# Patient Record
Sex: MALE | Race: WHITE | Employment: UNEMPLOYED | URBAN - NONMETROPOLITAN AREA
[De-identification: names, ages, dates, MRNs, and addresses within clinical notes are randomized per-mention and may not be internally consistent; named-entity substitution may affect disease eponyms.]

---
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
## 2022-03-23 ENCOUNTER — OFFICE VISIT (OUTPATIENT)
Dept: PRIMARY CARE CLINIC | Age: 22
End: 2022-03-23
Payer: COMMERCIAL

## 2022-03-23 VITALS
SYSTOLIC BLOOD PRESSURE: 104 MMHG | OXYGEN SATURATION: 97 % | WEIGHT: 167.6 LBS | BODY MASS INDEX: 25.48 KG/M2 | HEART RATE: 90 BPM | DIASTOLIC BLOOD PRESSURE: 62 MMHG | TEMPERATURE: 97.3 F

## 2022-03-23 DIAGNOSIS — R36.9 DRAINAGE FROM PENIS: ICD-10-CM

## 2022-03-23 DIAGNOSIS — Z20.2 EXPOSURE TO SEXUALLY TRANSMITTED DISEASE (STD): ICD-10-CM

## 2022-03-23 DIAGNOSIS — Z20.2 EXPOSURE TO SEXUALLY TRANSMITTED DISEASE (STD): Primary | ICD-10-CM

## 2022-03-23 PROCEDURE — 99213 OFFICE O/P EST LOW 20 MIN: CPT | Performed by: NURSE PRACTITIONER

## 2022-03-23 PROCEDURE — G8427 DOCREV CUR MEDS BY ELIG CLIN: HCPCS | Performed by: NURSE PRACTITIONER

## 2022-03-23 PROCEDURE — 1036F TOBACCO NON-USER: CPT | Performed by: NURSE PRACTITIONER

## 2022-03-23 PROCEDURE — G8419 CALC BMI OUT NRM PARAM NOF/U: HCPCS | Performed by: NURSE PRACTITIONER

## 2022-03-23 PROCEDURE — G8484 FLU IMMUNIZE NO ADMIN: HCPCS | Performed by: NURSE PRACTITIONER

## 2022-03-23 RX ORDER — DOXYCYCLINE HYCLATE 100 MG
100 TABLET ORAL 2 TIMES DAILY
Qty: 20 TABLET | Refills: 0 | Status: SHIPPED | OUTPATIENT
Start: 2022-03-23 | End: 2022-04-02

## 2022-03-23 RX ORDER — METRONIDAZOLE 500 MG/1
2000 TABLET ORAL ONCE
Qty: 4 TABLET | Refills: 0 | Status: SHIPPED | OUTPATIENT
Start: 2022-03-23 | End: 2022-03-23

## 2022-03-23 ASSESSMENT — PATIENT HEALTH QUESTIONNAIRE - PHQ9
1. LITTLE INTEREST OR PLEASURE IN DOING THINGS: 0
2. FEELING DOWN, DEPRESSED OR HOPELESS: 0
SUM OF ALL RESPONSES TO PHQ QUESTIONS 1-9: 0
SUM OF ALL RESPONSES TO PHQ QUESTIONS 1-9: 0
SUM OF ALL RESPONSES TO PHQ9 QUESTIONS 1 & 2: 0
SUM OF ALL RESPONSES TO PHQ QUESTIONS 1-9: 0
SUM OF ALL RESPONSES TO PHQ QUESTIONS 1-9: 0

## 2022-03-23 NOTE — PROGRESS NOTES
Chief Complaint:   Exposure to STD      History of Present Illness   Source of history provided by:  patient. Zeke Monroy is a 24 y.o. old male who presents to the [facility] care for concerns of known exposure to STD with abdominal pain, cloudy urine, dysuria, penile discharge and urinary frequency. He reports his partner called him and stated that she had gonorrhea and chlamydia. He reports that this is the second time that he has had to be checked and treated for an STD. Pt denies any fever, chills, back pain, pain with erection, or lethargy. Review of Systems    Unless otherwise stated in this report or unable to obtain because of the patient's clinical or mental status as evidenced by the medical record, this patients's positive and negative responses for Review of Systems, constitutional, psych, eyes, ENT, cardiovascular, respiratory, gastrointestinal, neurological, genitourinary, musculoskeletal, integument systems and systems related to the presenting problem are either stated in the preceding or were not pertinent or were negative for the symptoms and/or complaints related to the medical problem. Past Medical History:  has no past medical history on file. Past Surgical History:  has no past surgical history on file. Social History:  reports that he has never smoked. He has never used smokeless tobacco.  Family History: family history is not on file. Allergies: Patient has no known allergies. Physical Exam   Vital Signs:  /62 (Site: Left Upper Arm, Position: Sitting, Cuff Size: Large Adult)   Pulse 90   Temp 97.3 °F (36.3 °C) (Temporal)   Wt 167 lb 9.6 oz (76 kg)   SpO2 97%   BMI 25.48 kg/m²    Oxygen Saturation Interpretation: Normal.    Constitutional:  Alert, development consistent with age. Neck:  Normal ROM. Supple.   Lungs:  Clear to auscultation and breath sounds equal.  Heart:  Regular rate and rhythm, normal heart sounds, without pathological murmurs, ectopy, gallops, or rubs. Abdomen:  Soft, nontender, good bowel sounds. No firm or pulsatile mass. Genitalia Exam:        Penis: No obvious lesions noted on shaft of penis. Scrotum: No tenderness or lesions noted. Testicle: Descended bilaterally. No masses noted. Skin:  Normal turgor. Warm, dry, without visible rash, unless noted elsewhere. Neurological:  Orientation age-appropriate. Motor functions intact. Test Results Section   (All laboratory and radiology results have been personally reviewed by myself)  Labs:  No results found for this visit on 03/23/22. Assessment / Plan     Impression(s):  Theresa Thornton was seen today for exposure to std. Diagnoses and all orders for this visit:    Exposure to sexually transmitted disease (STD)  -     metroNIDAZOLE (FLAGYL) 500 MG tablet; Take 4 tablets by mouth once for 1 dose  -     doxycycline hyclate (VIBRA-TABS) 100 MG tablet; Take 1 tablet by mouth 2 times daily for 10 days  -     C.TRACHOMATIS Julia Jensen; Future    Drainage from penis        Urine sent for GC/CT testing today. Labs obtained and pending, will call with results once available. Pt will be required to give their SSN to receive these results via phone. I am going to cover pt for GC/CT today with metronidazole and doxycycline, potential reactions discussed. Advise f/u with PCP if still symptomatic after 1 week. ED sooner if symptoms worsen or change. ED immediately with the development of fever, lethargy, pain with erection, abdominal pain, vomiting, or back pain. Pt states understanding and is in agreement with this care plan. All questions answered. Return if symptoms worsen or fail to improve.     JOSE Berry - NAHID

## 2022-03-25 LAB
C. TRACHOMATIS DNA ,URINE: NEGATIVE
N. GONORRHOEAE DNA, URINE: NEGATIVE
SOURCE: NORMAL

## 2022-03-28 ENCOUNTER — OFFICE VISIT (OUTPATIENT)
Dept: PRIMARY CARE CLINIC | Age: 22
End: 2022-03-28
Payer: COMMERCIAL

## 2022-03-28 VITALS
WEIGHT: 167.4 LBS | TEMPERATURE: 97.8 F | BODY MASS INDEX: 25.45 KG/M2 | HEART RATE: 105 BPM | DIASTOLIC BLOOD PRESSURE: 62 MMHG | SYSTOLIC BLOOD PRESSURE: 120 MMHG | OXYGEN SATURATION: 98 %

## 2022-03-28 DIAGNOSIS — N50.819 TESTICULAR DISCOMFORT: Primary | ICD-10-CM

## 2022-03-28 PROCEDURE — 99213 OFFICE O/P EST LOW 20 MIN: CPT

## 2022-03-28 NOTE — PROGRESS NOTES
Chief Complaint       Other (umbilical hernia for approx. 1 year ,   numbness in right testicle  )      History of Present Illness   Source of history provided by:  patient. Jaime Ramon is a 24 y.o. male who presenting to the walk in clinic for evaluation of right testicular \"numbness\" intermittently x 6 months. Patient states he was told he has an inguinal hernia on the right side. He was seen on 3/23 and evaluated for similar symptoms which he thought may have been due to STD exposure, testing negative. Denies any testicular pain, abdominal pain, discharge, hematuria, dysuria, nausea, or vomiting. The complaint has been constant in severity. Patient has job that requires heavy lifting and works out heavily. He requests excuse for work today. ROS    Unless otherwise stated in this report or unable to obtain because of the patient's clinical or mental status as evidenced by the medical record, this patients's positive and negative responses for Review of Systems, constitutional, psych, eyes, ENT, cardiovascular, respiratory, gastrointestinal, neurological, genitourinary, musculoskeletal, integument systems and systems related to the presenting problem are either stated in the preceding or were not pertinent or were negative for the symptoms and/or complaints related to the medical problem. Physical Exam         VS:  /62 (Site: Left Upper Arm, Position: Sitting, Cuff Size: Large Adult)   Pulse 105   Temp 97.8 °F (36.6 °C) (Temporal)   Wt 167 lb 6.4 oz (75.9 kg)   SpO2 98%   BMI 25.45 kg/m²    Oxygen Saturation Interpretation: Normal.  Constitutional:  Alert, development consistent with age. Eyes:  PERRL, EOMI, no discharge or conjunctival injection. Ears:  External ears without lesions. TM's clear without erythema or perforation bilaterally. Throat:  Pharynx without injection, exudate, or tonsillar hypertrophy. Airway patient. Neck:  Normal ROM. Supple.   Lungs:  Clear to auscultation and breath sounds equal.  Heart:  Regular rate and rhythm, normal heart sounds, without pathological murmurs, ectopy, gallops, or rubs. Abdomen:  Soft, nontender, good bowel sounds. No firm or pulsatile mass. No hernias noted. Genitalia Exam:        Penis: No obvious lesions noted on shaft of penis. No drainage noted from the meatus. Scrotum: No tenderness or lesions noted. Numbness not affected by elevation. Testicle: Descended bilaterally. No masses noted. Cremasteric reflex present. Back:  No costovertebral tenderness. Skin:  Normal turgor. Warm, dry, without visible rash, unless noted elsewhere. Neurological:  Alert and oriented. Motor functions intact. Lab / Imaging Results   (All laboratory and radiology results have been personally reviewed by myself)  Labs:  No results found for this visit on 03/28/22. Imaging: All Radiology results interpreted by Radiologist unless otherwise noted. Assessment / Plan     Impression(s):  Solo Orellana was seen today for other. Diagnoses and all orders for this visit:    Testicular discomfort  -     US SCROTUM AND TESTICLES; Future    Other orders  -     Cancel: [address]; Future      Disposition:  Disposition: Discharge to home. Order placed for US Scrotum and testes. Will call with results once avaliable. Patient advised to complete US today, he states he would complete it later in the week due to transportation issues and no current symptoms. Educated on possible emergent causes for symptoms and need for US STAT, patient aware. Pt advised to f/u with PCP in 5-7 days for continued management and further care. ER immediately if symptoms worsen or change. Red flag symptoms discussed. Patient states understanding is in agreement with this care plan. All questions answered. ANGELA Brar    **This report was transcribed using voice recognition software.  Every effort was made to ensure accuracy; however, inadvertent computerized transcription errors may be present.

## 2022-03-28 NOTE — LETTER
[address] [facility] [address]  Phone: [phone]  Fax: [phone]    ANGELA Prince        March 28, 2022     Patient: Twan Turner   YOB: 2000   Date of Visit: 3/28/2022       To Whom It May Concern: It is my medical opinion that Twan Turner should remain out of work until 3/29. If you have any questions or concerns, please don't hesitate to call.     Sincerely,        ANGELA Prince

## 2022-04-13 ENCOUNTER — TELEPHONE (OUTPATIENT)
Dept: PRIMARY CARE CLINIC | Age: 22
End: 2022-04-13

## 2022-04-13 DIAGNOSIS — N50.819 TESTICULAR DISCOMFORT: ICD-10-CM

## 2022-04-13 NOTE — TELEPHONE ENCOUNTER
Patient called in requesting his US Scrotum results, requested report from [facility] and abstracted result in system for provider review. Please advise at your earliest convenience.

## 2022-04-25 ENCOUNTER — OFFICE VISIT (OUTPATIENT)
Dept: PRIMARY CARE CLINIC | Age: 22
End: 2022-04-25
Payer: COMMERCIAL

## 2022-04-25 VITALS
HEIGHT: 69 IN | BODY MASS INDEX: 25.48 KG/M2 | WEIGHT: 172 LBS | TEMPERATURE: 98.4 F | RESPIRATION RATE: 18 BRPM | SYSTOLIC BLOOD PRESSURE: 110 MMHG | OXYGEN SATURATION: 97 % | HEART RATE: 86 BPM | DIASTOLIC BLOOD PRESSURE: 72 MMHG

## 2022-04-25 DIAGNOSIS — N50.819 TESTICULAR DISCOMFORT: ICD-10-CM

## 2022-04-25 DIAGNOSIS — K40.90 NON-RECURRENT UNILATERAL INGUINAL HERNIA WITHOUT OBSTRUCTION OR GANGRENE: ICD-10-CM

## 2022-04-25 DIAGNOSIS — N50.89 TESTICULAR MICROLITHIASIS: Primary | ICD-10-CM

## 2022-04-25 PROCEDURE — 99213 OFFICE O/P EST LOW 20 MIN: CPT | Performed by: NURSE PRACTITIONER

## 2022-04-25 PROCEDURE — G8427 DOCREV CUR MEDS BY ELIG CLIN: HCPCS | Performed by: NURSE PRACTITIONER

## 2022-04-25 PROCEDURE — 4004F PT TOBACCO SCREEN RCVD TLK: CPT | Performed by: NURSE PRACTITIONER

## 2022-04-25 PROCEDURE — G8419 CALC BMI OUT NRM PARAM NOF/U: HCPCS | Performed by: NURSE PRACTITIONER

## 2022-04-25 SDOH — ECONOMIC STABILITY: FOOD INSECURITY: WITHIN THE PAST 12 MONTHS, YOU WORRIED THAT YOUR FOOD WOULD RUN OUT BEFORE YOU GOT MONEY TO BUY MORE.: NEVER TRUE

## 2022-04-25 SDOH — ECONOMIC STABILITY: FOOD INSECURITY: WITHIN THE PAST 12 MONTHS, THE FOOD YOU BOUGHT JUST DIDN'T LAST AND YOU DIDN'T HAVE MONEY TO GET MORE.: NEVER TRUE

## 2022-04-25 ASSESSMENT — ENCOUNTER SYMPTOMS
RECTAL PAIN: 0
ANAL BLEEDING: 0
ABDOMINAL PAIN: 0
BLOOD IN STOOL: 0
COLOR CHANGE: 0

## 2022-04-25 ASSESSMENT — SOCIAL DETERMINANTS OF HEALTH (SDOH): HOW HARD IS IT FOR YOU TO PAY FOR THE VERY BASICS LIKE FOOD, HOUSING, MEDICAL CARE, AND HEATING?: NOT HARD AT ALL

## 2022-04-25 NOTE — PATIENT INSTRUCTIONS
Patient Education        Inguinal Hernia: Care Instructions  Your Care Instructions     An inguinal hernia occurs when tissue bulges through a weak spot in your groin area. You may see or feel a tender bulge in the groin or scrotum. You may alsohave pain, pressure or burning, or a feeling that something has \"given way. \"  Hernias are caused by a weakness in the belly wall. The bulge or discomfort may occur after heavy lifting, straining, or coughing. Hernias do not heal on theirown, and they tend to get worse over time. If your hernia does not bother you, you most likely can wait to have surgery. Your hernia may get worse, but it may not. In some cases, hernias that aresmall and painless may never need to be repaired. Follow-up care is a key part of your treatment and safety. Be sure to make and go to all appointments, and call your doctor if you are having problems. It's also a good idea to know your test results and keep alist of the medicines you take. How can you care for yourself at home?  Take pain medicines exactly as directed. ? If the doctor gave you a prescription medicine for pain, take it as prescribed. ? If you are not taking a prescription pain medicine, ask your doctor if you can take an over-the-counter medicine.  Use proper lifting techniques, and avoid heavy lifting if you can. To lift things more safely, bend your knees and let your arms and legs do the work. Keep your back straight, and do not bend over at the waist. Keep the load as close to your body as you can. Move your feet instead of turning or twisting your body.  Lose weight if you are overweight.  Include fruits, vegetables, legumes, and whole grains in your diet each day. These foods are high in fiber and will make it easier to avoid straining during bowel movements.  Do not smoke. Smoking can cause coughing, which can cause your hernia to bulge.  If you need help quitting, talk to your doctor about stop-smoking programs infection. Testicular torsion happens when a testicle twists on the spermatic cord. This cuts off the blood supply to the testicle. This is an emergency that oftenrequires immediate surgery. Follow-up care is a key part of your treatment and safety. Be sure to make and go to all appointments, and call your doctor if you are having problems. It's also a good idea to know your test results and keep alist of the medicines you take. How can you care for yourself at home?  Rest and protect your testicles and groin. Stop, change, or take a break from any activity that may be causing your pain or soreness.  Put ice or a cold pack on the area for 10 to 20 minutes at a time. Put a thin cloth between the ice and your skin.  Wear briefs, not boxers. Briefs help support the injured area. You can use snug underwear or compression shorts if it helps relieve your pain.  If your doctor prescribed antibiotics, take them as directed. Do not stop taking them just because you feel better. You need to take the full course of antibiotics.  Ask your doctor if you can take an over-the-counter pain medicine, such as acetaminophen (Tylenol), ibuprofen (Advil, Motrin), or naproxen (Aleve). Be safe with medicines. Read and follow all instructions on the label.  If the doctor gave you a prescription medicine for pain, take it as prescribed. When should you call for help? Call your doctor now or seek immediate medical care if:     You have severe or increasing pain.      You notice a change in how your testicles look or are positioned in your scrotum.      You notice new or worse swelling in your scrotum.      You have symptoms of a urinary problem, such as a urinary tract infection. These may include:  ? Pain or burning when you urinate. ? A frequent need to urinate without being able to pass much urine. ? Pain in the flank, which is just below the rib cage and above the waist on either side of the back. ?  Blood in your urine.  ? A fever. Watch closely for changes in your health, and be sure to contact your doctor if:     You do not get better as expected. Where can you learn more? Go to https://[employer]. org and sign in to your [employer] account. Enter K697 in the [employer] box to learn more about \"Testicular Pain: Care Instructions. \"     If you do not have an account, please click on the \"Sign Up Now\" link. Current as of: October 18, 2021               Content Version: 13.2  © 8456-9657 Healthwise, Incorporated. Care instructions adapted under license by [facility] ([facility]). If you have questions about a medical condition or this instruction, always ask your healthcare professional. [address] any warranty or liability for your use of this information.

## 2022-04-25 NOTE — PROGRESS NOTES
Tim Cohen (:  2000) is a 24 y.o. male,New patient, here for evaluation of the following chief complaint(s):  Establish Care (he wants to discuss about US results that he got when he came as a walking )         ASSESSMENT/PLAN:  1. Testicular microlithiasis  -     Amb External Referral To Urology  2. Non-recurrent unilateral inguinal hernia without obstruction or gangrene  3. Testicular discomfort  -     Amb External Referral To Urology      Return in about 3 months (around 2022). Subjective   SUBJECTIVE/OBJECTIVE:  Here to discuss the US of the Testicles, apparently the patient was seen in a walk-in status and an ultrasound of the testicle was done showing a small inguinal hernia along with some stones in the testicular sac. He is here today to discuss those and he still having discomfort in that region. He denies any great amount of pain but it is off and on and now that he has the ultrasound is really concerned about the stones and their possible correlation with testicular cancer. On that note I did an immediate referral to urology for them to evaluate and treat. Patient seemed very happy about getting urology on board and we will discuss maybe getting general surgery on board later for the inguinal hernia. LAST VISIT WITH URGENT CARE  Tim Cohen is a 24 y.o. male who presenting to the walk in clinic for evaluation of right testicular \"numbness\" intermittently x 6 months. Patient states he was told he has an inguinal hernia on the right side. He was seen on 3/23 and evaluated for similar symptoms which he thought may have been due to STD exposure, testing negative. Denies any testicular pain, abdominal pain, discharge, hematuria, dysuria, nausea, or vomiting. The complaint has been constant in severity. Patient has job that requires heavy lifting and works out heavily. He requests excuse for work today.         Review of Systems   Constitutional: Negative for activity change and fever. HENT: Negative for nosebleeds. Cardiovascular: Negative for chest pain, palpitations and leg swelling. Gastrointestinal: Negative for abdominal pain, anal bleeding, blood in stool and rectal pain. Genitourinary: Positive for testicular pain. Negative for hematuria. Skin: Negative for color change and pallor. Neurological: Negative for light-headedness and headaches. Hematological: Does not bruise/bleed easily. Objective   Physical Exam  Constitutional:       Appearance: Normal appearance. HENT:      Head: Normocephalic. Nose: Nose normal.   Eyes:      Pupils: Pupils are equal, round, and reactive to light. Cardiovascular:      Rate and Rhythm: Normal rate and regular rhythm. Pulmonary:      Effort: Pulmonary effort is normal.   Abdominal:      General: Abdomen is flat. Palpations: Abdomen is soft. Musculoskeletal:         General: Normal range of motion. Skin:     General: Skin is warm and dry. Neurological:      General: No focal deficit present. Mental Status: He is alert. Mental status is at baseline. Psychiatric:         Mood and Affect: Mood normal.            On this date 4/25/2022 I have spent 20 minutes reviewing previous notes, test results and face to face with the patient discussing the diagnosis and importance of compliance with the treatment plan as well as documenting on the day of the visit. An electronic signature was used to authenticate this note.         --JOSE Moody - CNP

## 2022-06-06 ENCOUNTER — TELEPHONE (OUTPATIENT)
Dept: PRIMARY CARE CLINIC | Age: 22
End: 2022-06-06

## 2022-06-06 NOTE — TELEPHONE ENCOUNTER
Patient is wondering about urology referral, I think he has not gotten a call. Please advise. Thanks.

## 2022-06-06 NOTE — TELEPHONE ENCOUNTER
Left voicemail for Dr. Daniela Pena office to call back today regarding status of referral sent to them on 4/29. Awaiting returned call. Will call patient once I have update from Urology.

## 2022-06-06 NOTE — TELEPHONE ENCOUNTER
Tried three times today to get a hold of patient with number listed with no luck, message error states can not complete your call at this time.

## 2022-06-06 NOTE — TELEPHONE ENCOUNTER
Spoke with Dr. Huizar Russian office, they confirmed that they had already seen patient for initial visit on 5/17, and he has a follow up with them tomorrow at 1. Tried calling patient to clarify with no luck, instant message comes on and states can not complete your call at this time. Will try again soon.

## 2022-11-10 ENCOUNTER — OFFICE VISIT (OUTPATIENT)
Dept: FAMILY MEDICINE CLINIC | Age: 22
End: 2022-11-10
Payer: COMMERCIAL

## 2022-11-10 VITALS
OXYGEN SATURATION: 98 % | RESPIRATION RATE: 18 BRPM | SYSTOLIC BLOOD PRESSURE: 118 MMHG | HEIGHT: 69 IN | TEMPERATURE: 98.6 F | HEART RATE: 120 BPM | WEIGHT: 180 LBS | DIASTOLIC BLOOD PRESSURE: 66 MMHG | BODY MASS INDEX: 26.66 KG/M2

## 2022-11-10 DIAGNOSIS — J02.9 ACUTE PHARYNGITIS, UNSPECIFIED ETIOLOGY: ICD-10-CM

## 2022-11-10 DIAGNOSIS — K12.2 UVULITIS: Primary | ICD-10-CM

## 2022-11-10 DIAGNOSIS — R09.82 PND (POST-NASAL DRIP): ICD-10-CM

## 2022-11-10 PROCEDURE — G8419 CALC BMI OUT NRM PARAM NOF/U: HCPCS | Performed by: EMERGENCY MEDICINE

## 2022-11-10 PROCEDURE — 4004F PT TOBACCO SCREEN RCVD TLK: CPT | Performed by: EMERGENCY MEDICINE

## 2022-11-10 PROCEDURE — G8484 FLU IMMUNIZE NO ADMIN: HCPCS | Performed by: EMERGENCY MEDICINE

## 2022-11-10 PROCEDURE — 99213 OFFICE O/P EST LOW 20 MIN: CPT | Performed by: EMERGENCY MEDICINE

## 2022-11-10 PROCEDURE — G8427 DOCREV CUR MEDS BY ELIG CLIN: HCPCS | Performed by: EMERGENCY MEDICINE

## 2022-11-10 RX ORDER — CETIRIZINE HYDROCHLORIDE 10 MG/1
10 TABLET ORAL DAILY
Qty: 30 TABLET | Refills: 0 | Status: SHIPPED | OUTPATIENT
Start: 2022-11-10 | End: 2022-12-10

## 2022-11-10 RX ORDER — PREDNISONE 20 MG/1
20 TABLET ORAL 2 TIMES DAILY
Qty: 10 TABLET | Refills: 0 | Status: SHIPPED | OUTPATIENT
Start: 2022-11-10 | End: 2022-11-15

## 2022-11-10 RX ORDER — AMOXICILLIN AND CLAVULANATE POTASSIUM 875; 125 MG/1; MG/1
1 TABLET, FILM COATED ORAL 2 TIMES DAILY
Qty: 20 TABLET | Refills: 0 | Status: SHIPPED | OUTPATIENT
Start: 2022-11-10 | End: 2022-11-20

## 2022-11-10 ASSESSMENT — ENCOUNTER SYMPTOMS
COUGH: 0
EYE REDNESS: 0
EYE DISCHARGE: 0
ABDOMINAL PAIN: 0
NAUSEA: 0
VOMITING: 0
EYE PAIN: 0
BACK PAIN: 0
SINUS PRESSURE: 0
SHORTNESS OF BREATH: 0
SORE THROAT: 1
WHEEZING: 0
DIARRHEA: 0

## 2022-11-10 NOTE — PROGRESS NOTES
Chief Complaint:   Pharyngitis      History of Present Illness   HPI:  Kait Gomez is a 24 y.o. male who presents to [facility] today for sore throat and swollen neck lymph nodes. He tells staff he does NOT want tested for Strept pharyngitis. Prior to Visit Medications    Not on File       Review of Systems   Review of Systems   Constitutional:  Positive for activity change. Negative for chills and fever. HENT:  Positive for sore throat. Negative for ear pain and sinus pressure. Eyes:  Negative for pain, discharge and redness. Respiratory:  Negative for cough, shortness of breath and wheezing. Cardiovascular:  Negative for chest pain. Gastrointestinal:  Negative for abdominal pain, diarrhea, nausea and vomiting. Genitourinary:  Negative for dysuria and frequency. Musculoskeletal:  Positive for neck pain. Negative for arthralgias and back pain. Skin:  Negative for rash and wound. Neurological:  Negative for weakness and headaches. Hematological:  Negative for adenopathy. Psychiatric/Behavioral: Negative. All other systems reviewed and are negative. Patient's medical, social, and family history reviewed    Past Medical History:  has no past medical history on file. Past Surgical History:  has no past surgical history on file. Social History:  reports that he has been smoking cigarettes. He has a 0.10 pack-year smoking history. He has never used smokeless tobacco.  Family History: family history is not on file. Allergies: Patient has no known allergies. Physical Exam   Vital Signs:  /66   Pulse (!) 120   Temp 98.6 °F (37 °C) (Temporal)   Resp 18   Ht 5' 9\" (1.753 m)   Wt 180 lb (81.6 kg)   SpO2 98%   BMI 26.58 kg/m²    Oxygen Saturation Interpretation: Normal.    Physical Exam  Vitals and nursing note reviewed. Constitutional:       Appearance: He is well-developed. HENT:      Head: Normocephalic and atraumatic.       Right Ear: Tympanic membrane normal.      Left Ear: Tympanic membrane normal.      Mouth/Throat:      Pharynx: Uvula midline. Oropharyngeal exudate, posterior oropharyngeal erythema and uvula swelling present. No pharyngeal swelling. Eyes:      Pupils: Pupils are equal, round, and reactive to light. Cardiovascular:      Rate and Rhythm: Regular rhythm. Tachycardia present. Heart sounds: Normal heart sounds. No murmur heard. Pulmonary:      Effort: Pulmonary effort is normal. No respiratory distress. Breath sounds: Normal breath sounds. No wheezing or rales. Abdominal:      General: Bowel sounds are normal.      Palpations: Abdomen is soft. Tenderness: There is no abdominal tenderness. There is no guarding or rebound. Musculoskeletal:      Cervical back: Normal range of motion and neck supple. Lymphadenopathy:      Cervical: Cervical adenopathy present. Right cervical: Superficial cervical adenopathy present. Skin:     General: Skin is warm and dry. Neurological:      Mental Status: He is alert and oriented to person, place, and time. Cranial Nerves: No cranial nerve deficit. Coordination: Coordination normal.       Test Results Section   (All laboratory and radiology results have been personally reviewed by myself)  Labs:  No results found for this visit on 11/10/22. The patient does not want tested for STREPT. Imaging: All Radiology results interpreted by Radiologist unless otherwise noted. No results found. Assessment / Plan   Impression(s):  There are no diagnoses linked to this encounter. Discharged home.   Patient condition is good    Call pcp if no iprovement    New Medications     New Prescriptions    No medications on file       Electronically signed by Raymon Alas DO   DD: 11/10/22